# Patient Record
Sex: FEMALE | Race: WHITE | NOT HISPANIC OR LATINO
[De-identification: names, ages, dates, MRNs, and addresses within clinical notes are randomized per-mention and may not be internally consistent; named-entity substitution may affect disease eponyms.]

---

## 2018-08-22 ENCOUNTER — RESULT REVIEW (OUTPATIENT)
Age: 58
End: 2018-08-22

## 2020-08-21 ENCOUNTER — FORM ENCOUNTER (OUTPATIENT)
Age: 60
End: 2020-08-21

## 2021-03-23 ENCOUNTER — NON-APPOINTMENT (OUTPATIENT)
Age: 61
End: 2021-03-23

## 2021-03-23 DIAGNOSIS — Z83.49 FAMILY HISTORY OF OTHER ENDOCRINE, NUTRITIONAL AND METABOLIC DISEASES: ICD-10-CM

## 2021-03-23 DIAGNOSIS — Z92.89 PERSONAL HISTORY OF OTHER MEDICAL TREATMENT: ICD-10-CM

## 2021-03-23 DIAGNOSIS — Z80.8 FAMILY HISTORY OF MALIGNANT NEOPLASM OF OTHER ORGANS OR SYSTEMS: ICD-10-CM

## 2021-03-23 DIAGNOSIS — Z83.71 FAMILY HISTORY OF COLONIC POLYPS: ICD-10-CM

## 2021-03-23 DIAGNOSIS — H25.9 UNSPECIFIED AGE-RELATED CATARACT: ICD-10-CM

## 2021-03-23 DIAGNOSIS — H00.019 HORDEOLUM EXTERNUM UNSPECIFIED EYE, UNSPECIFIED EYELID: ICD-10-CM

## 2021-03-23 DIAGNOSIS — Z83.3 FAMILY HISTORY OF DIABETES MELLITUS: ICD-10-CM

## 2021-03-23 DIAGNOSIS — Z86.79 PERSONAL HISTORY OF OTHER DISEASES OF THE CIRCULATORY SYSTEM: ICD-10-CM

## 2021-03-23 DIAGNOSIS — Z82.5 FAMILY HISTORY OF ASTHMA AND OTHER CHRONIC LOWER RESPIRATORY DISEASES: ICD-10-CM

## 2021-03-23 DIAGNOSIS — K63.5 POLYP OF COLON: ICD-10-CM

## 2021-03-23 DIAGNOSIS — R21 RASH AND OTHER NONSPECIFIC SKIN ERUPTION: ICD-10-CM

## 2021-03-23 DIAGNOSIS — Z83.79 FAMILY HISTORY OF OTHER DISEASES OF THE DIGESTIVE SYSTEM: ICD-10-CM

## 2021-03-23 DIAGNOSIS — M54.5 LOW BACK PAIN: ICD-10-CM

## 2021-03-23 DIAGNOSIS — Z80.42 FAMILY HISTORY OF MALIGNANT NEOPLASM OF PROSTATE: ICD-10-CM

## 2021-03-23 RX ORDER — CARVEDILOL 6.25 MG/1
6.25 TABLET, FILM COATED ORAL
Refills: 0 | Status: ACTIVE | COMMUNITY

## 2021-03-23 RX ORDER — AMLODIPINE BESYLATE 2.5 MG/1
2.5 TABLET ORAL DAILY
Refills: 0 | Status: ACTIVE | COMMUNITY

## 2021-03-23 RX ORDER — VALACYCLOVIR HYDROCHLORIDE 1 G/1
1 TABLET, FILM COATED ORAL
Refills: 0 | Status: ACTIVE | COMMUNITY

## 2021-04-07 ENCOUNTER — APPOINTMENT (OUTPATIENT)
Dept: FAMILY MEDICINE | Facility: CLINIC | Age: 61
End: 2021-04-07

## 2021-06-03 ENCOUNTER — RX RENEWAL (OUTPATIENT)
Age: 61
End: 2021-06-03

## 2021-09-01 ENCOUNTER — RX RENEWAL (OUTPATIENT)
Age: 61
End: 2021-09-01

## 2021-11-30 ENCOUNTER — RX RENEWAL (OUTPATIENT)
Age: 61
End: 2021-11-30

## 2021-12-26 ENCOUNTER — FORM ENCOUNTER (OUTPATIENT)
Age: 61
End: 2021-12-26

## 2021-12-29 ENCOUNTER — NON-APPOINTMENT (OUTPATIENT)
Age: 61
End: 2021-12-29

## 2022-03-02 ENCOUNTER — APPOINTMENT (OUTPATIENT)
Dept: FAMILY MEDICINE | Facility: CLINIC | Age: 62
End: 2022-03-02
Payer: COMMERCIAL

## 2022-03-02 VITALS
BODY MASS INDEX: 24.75 KG/M2 | WEIGHT: 145 LBS | HEART RATE: 62 BPM | OXYGEN SATURATION: 99 % | SYSTOLIC BLOOD PRESSURE: 120 MMHG | HEIGHT: 64 IN | DIASTOLIC BLOOD PRESSURE: 80 MMHG | TEMPERATURE: 97.2 F

## 2022-03-02 DIAGNOSIS — Z87.891 PERSONAL HISTORY OF NICOTINE DEPENDENCE: ICD-10-CM

## 2022-03-02 DIAGNOSIS — Z99.89 OBSTRUCTIVE SLEEP APNEA (ADULT) (PEDIATRIC): ICD-10-CM

## 2022-03-02 DIAGNOSIS — K90.0 CELIAC DISEASE: ICD-10-CM

## 2022-03-02 DIAGNOSIS — G47.30 SLEEP APNEA, UNSPECIFIED: ICD-10-CM

## 2022-03-02 DIAGNOSIS — Z23 ENCOUNTER FOR IMMUNIZATION: ICD-10-CM

## 2022-03-02 DIAGNOSIS — I10 ESSENTIAL (PRIMARY) HYPERTENSION: ICD-10-CM

## 2022-03-02 DIAGNOSIS — G47.33 OBSTRUCTIVE SLEEP APNEA (ADULT) (PEDIATRIC): ICD-10-CM

## 2022-03-02 DIAGNOSIS — E03.9 HYPOTHYROIDISM, UNSPECIFIED: ICD-10-CM

## 2022-03-02 DIAGNOSIS — R73.01 IMPAIRED FASTING GLUCOSE: ICD-10-CM

## 2022-03-02 DIAGNOSIS — F51.05 ANXIETY DISORDER, UNSPECIFIED: ICD-10-CM

## 2022-03-02 DIAGNOSIS — F41.9 ANXIETY DISORDER, UNSPECIFIED: ICD-10-CM

## 2022-03-02 DIAGNOSIS — E55.9 VITAMIN D DEFICIENCY, UNSPECIFIED: ICD-10-CM

## 2022-03-02 DIAGNOSIS — G43.909 MIGRAINE, UNSPECIFIED, NOT INTRACTABLE, W/OUT STATUS MIGRAINOSUS: ICD-10-CM

## 2022-03-02 PROCEDURE — 36415 COLL VENOUS BLD VENIPUNCTURE: CPT

## 2022-03-02 PROCEDURE — 99214 OFFICE O/P EST MOD 30 MIN: CPT | Mod: 25

## 2022-03-03 ENCOUNTER — NON-APPOINTMENT (OUTPATIENT)
Age: 62
End: 2022-03-03

## 2022-03-03 LAB
25(OH)D3 SERPL-MCNC: 72.5 NG/ML
ALBUMIN SERPL ELPH-MCNC: 4.6 G/DL
ALP BLD-CCNC: 75 U/L
ALT SERPL-CCNC: 14 U/L
ANION GAP SERPL CALC-SCNC: 13 MMOL/L
AST SERPL-CCNC: 15 U/L
BILIRUB SERPL-MCNC: 0.4 MG/DL
BUN SERPL-MCNC: 14 MG/DL
CALCIUM SERPL-MCNC: 9.6 MG/DL
CHLORIDE SERPL-SCNC: 108 MMOL/L
CHOLEST SERPL-MCNC: 197 MG/DL
CO2 SERPL-SCNC: 22 MMOL/L
CREAT SERPL-MCNC: 0.64 MG/DL
EGFR: 99 ML/MIN/1.73M2
ESTIMATED AVERAGE GLUCOSE: 120 MG/DL
GLUCOSE SERPL-MCNC: 101 MG/DL
HBA1C MFR BLD HPLC: 5.8 %
HDLC SERPL-MCNC: 38 MG/DL
LDLC SERPL CALC-MCNC: 108 MG/DL
NONHDLC SERPL-MCNC: 159 MG/DL
POTASSIUM SERPL-SCNC: 4.7 MMOL/L
PROT SERPL-MCNC: 6.9 G/DL
SODIUM SERPL-SCNC: 142 MMOL/L
T4 FREE SERPL-MCNC: 1.4 NG/DL
TRIGL SERPL-MCNC: 259 MG/DL
TSH SERPL-ACNC: 3.44 UIU/ML

## 2022-03-04 NOTE — COUNSELING
[Fall prevention counseling provided] : Fall prevention counseling provided [Behavioral health counseling provided] : Behavioral health counseling provided [Sleep ___ hours/day] : Sleep [unfilled] hours/day [Engage in a relaxing activity] : Engage in a relaxing activity [Plan in advance] : Plan in advance [None] : None [Good understanding] : Patient has a good understanding of lifestyle changes and steps needed to achieve self management goal [de-identified] : Maintain balanced diet of whole grain, fruits and vegetables, lean meats, skinless poultry, fish, beans, soy products, eggs, nuts, and low fat dairy as per FDA dietary guidelines. \par Avoid high calorie/low nutrient foods. \par vegetables/fruits- at least 5 servings/day, \par whole grains- 100% whole grain and at least 3 grams fiber/day.\par reduce/eliminate saturated fat- less than 5% on nutrition labels (ex. carrillo, deli meat, hot dogs, fried foods, cookies, ice cream, chips, soft drinks, etc.)\par stay within your FDA recommended daily calorie needs or use the plate method to portion intake. \par Avoid fast food and limit eating out. When eating out choose healthy alternatives (ex. grilled, baked, steamed. low fat dressings. avoid french fries).\par DO NOT CONSUME FOODS YOU ARE ALLERGIC TO DESPITE DIETARY RECOMMENDATIONS.\par \par For more information you can visit [email or web address] \par \par Incorporate regular physical activity most days of the week. \par Regular aerobic activity- moderate intensity, most days/wk, at least 30min/day- ex. brisk walk 2.5miles/hr, ballroom dancing, water aerobics, light yard work (raking/lawn mowing) actively playing basketball, biking 10miles/hr.\par Muscle strengthening and strength/resistance exercises 2-3days/wk- ex. free weights, resistance bands, your own weight (squats/pull-ups/push-ups).\par Neuro-motor exercises for balance/agility/coordination and flexibility exercises 2-3days/wk, 20-30min/day- ex. yoga and franco-chi.\par Bone strengthening at least 30min/day, most days of the week- ex. weights, resistance bands, running, brisk walking, jumping rope, stair climbing, tennis.\par Decrease sedentary time. \par LISTEN TO YOUR BODY AND MODIFY ACTIVITY AS NEEDED- DO NOT OVEREXERT YOURSELF DURING PHYSICAL ACTIVITY DESPITE RECOMMENDATION.\par \par For more information you can visit [email or web address].gov

## 2022-03-04 NOTE — ASSESSMENT
[FreeTextEntry1] : anxiety associated insomnia\par counseled on sleep hygiene\par trial hydroxyzine \par notify office if worsening/persistent symptoms or new onset complaints/concerns, in the event of any emergency or life threatening condition, go to the nearest emergency department and then notify office. \par patient verbalized understanding and agrees with plan of care. \par \par htn/ hld/ impaired fasting BG\par BP well managed \par on Amlodipine, Carvedilol, Metoprolol\par recheck lipid, a1c, cmp - "labs drawn in office"  \par in no acute distress and o/w offers no complaints \par cardiology Dr. Valente\par \par hypothyroid\par on levothyroxine\par check thyroid values\par in no acute distress and o/w offers no complaints \par \par low VitD\par recheck values\par in no acute distress and o/w offers no complaints \par \par BOOM on Cpap\par compliant with machine however interested in alternative therapies\par advised to f/u with sleep specialist\par patient verbalized understanding and agrees with plan of care. \par in no acute distress and o/w offers no complaints \par \par preventative health\par annual wellness- scheduling\par hx Zost vax- needs shingrex, checking with insurance and will schedule\par colonoscopy- 08/22/18 polyp, rpt w/ Dr. Hollis \par ophthalmology dilated eye exam- 2012, hx cataract Sx, opthalmology Dr. Ward\par GYN for Pap- 2014, no longer completing- refused, denies hx abn. Pap\par mammo- 12/21 BiRADS 1, rpt 1yr\par DEXA- 2019 Osteopenia, on VitD and calcium \par continued follow up with PCP for chronic concerns and preventative health management. \par patient verbalized understanding and agrees with plan of care.

## 2022-03-04 NOTE — REVIEW OF SYSTEMS
[Insomnia] : insomnia [Negative] : Heme/Lymph [Skin Rash] : no skin rash [Suicidal] : not suicidal [Anxiety] : no anxiety [Depression] : no depression

## 2022-03-04 NOTE — HEALTH RISK ASSESSMENT
[Former] : Former [Yes] : Yes [1 or 2 (0 pts)] : 1 or 2 (0 points) [Monthly or less (1 pt)] : Monthly or less (1 point) [Never (0 pts)] : Never (0 points) [No] : In the past 12 months have you used drugs other than those required for medical reasons? No [No falls in past year] : Patient reported no falls in the past year [0] : 2) Feeling down, depressed, or hopeless: Not at all (0) [PHQ-2 Negative - No further assessment needed] : PHQ-2 Negative - No further assessment needed [Patient/Caregiver not ready to engage] : , patient/caregiver not ready to engage [Audit-CScore] : 1 [de-identified] : active/ regular exercise, working  on weight loss [de-identified] : well balanced, low fat low chol [[id number]] : 0 [AdvancecareDate] : 02/22

## 2022-03-04 NOTE — HISTORY OF PRESENT ILLNESS
[FreeTextEntry1] : here for f/u and requesting BW  [de-identified] : this is a 62yo pmhx htn/ hld/ impaired fasting BG/ hypothyroid/ low VitD/ BOOM on Cpap, she is here requesting BW recheck.\par p/w concerns difficulty sleeping, she reports having difficulty falling asleep usually, difficulty staying asleep times, reports she is compliant with sleep machine. reports failed trial of OTC conservative mgmt, melatonin, failed sleep sounds/ conservative therapy. \par requesting trial of sleep aid. reports it is difficult to shut her mind off from thinking. \par o/w in no acute distress, no other major concerns and reports feeling well. \par will evaluate and manage as appropriate.

## 2022-03-04 NOTE — PHYSICAL EXAM
[No Acute Distress] : no acute distress [Well Nourished] : well nourished [PERRL] : pupils equal round and reactive to light [Well Developed] : well developed [No Respiratory Distress] : no respiratory distress  [No Accessory Muscle Use] : no accessory muscle use [Clear to Auscultation] : lungs were clear to auscultation bilaterally [No Carotid Bruits] : no carotid bruits [No Edema] : there was no peripheral edema [Soft] : abdomen soft [Non Tender] : non-tender [Non-distended] : non-distended [Normal Posterior Cervical Nodes] : no posterior cervical lymphadenopathy [Normal Anterior Cervical Nodes] : no anterior cervical lymphadenopathy [Coordination Grossly Intact] : coordination grossly intact [No Rash] : no rash [Normal Gait] : normal gait [Normal Mental Status] : the patient's orientation, memory, attention, language and fund of knowledge were normal [Appropriate] : appropriate [Normal Rate] : a normal rate [Normal Rhythm] : a normal rhythm [Normal Tone] : normal tone [Normal Volume] : normal volume [Normal] : normal throught processes [Normal Associations] :  no deficiency [Impaired judgment] : intact judgment [Impaired Insight] : intact insight [Delusions] : exhibited no delusions [Hallucinations] : exhibited no hallucinations [Obsessions] : denied obsessions [Preoccupation with Violence] : denied preoccupation with violent thoughts [Suicidal Ideation] : denied suicidal ideation [Suicidal Intent] : denied suicidal intent [Suicidal Plan] : denied suicidal plans [Homicidal Ideation] : denied homicidal ideation [Homicidal Intent] : denied homicidal intention [Homicidal Plan] : denied homicidal plans

## 2022-03-07 RX ORDER — HYDROXYZINE HYDROCHLORIDE 25 MG/1
25 TABLET ORAL
Qty: 15 | Refills: 0 | Status: COMPLETED | COMMUNITY
Start: 2022-03-02 | End: 2022-03-07

## 2022-03-23 ENCOUNTER — FORM ENCOUNTER (OUTPATIENT)
Age: 62
End: 2022-03-23

## 2022-04-05 ENCOUNTER — FORM ENCOUNTER (OUTPATIENT)
Age: 62
End: 2022-04-05

## 2022-04-15 ENCOUNTER — NON-APPOINTMENT (OUTPATIENT)
Age: 62
End: 2022-04-15

## 2022-04-15 DIAGNOSIS — R79.89 OTHER SPECIFIED ABNORMAL FINDINGS OF BLOOD CHEMISTRY: ICD-10-CM

## 2022-05-31 RX ORDER — LEVOTHYROXINE SODIUM 0.05 MG/1
50 TABLET ORAL
Qty: 45 | Refills: 3 | Status: ACTIVE | COMMUNITY
Start: 2021-06-03 | End: 1900-01-01